# Patient Record
Sex: FEMALE | URBAN - METROPOLITAN AREA
[De-identification: names, ages, dates, MRNs, and addresses within clinical notes are randomized per-mention and may not be internally consistent; named-entity substitution may affect disease eponyms.]

---
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
## 2023-01-01 NOTE — PROCEDURE: ISOTRETINOIN MONITORING
Interval/Overnight Events:         ========================VITAL SIGNS========================  T(C): 37.5 (23 @ 06:00), Max: 38.1 (23 @ 14:20)  HR: 131 (23 @ 06:00) (115 - 149)  BP: 64/36 (23 @ 12:52) (64/36 - 85/30)  ABP: 71/44 (23 @ 06:00) (60/35 - 75/46)  ABP(mean): 57 (23 @ 06:00) (47 - 62)  RR: 25 (23 @ 06:00) (25 - 36)  SpO2: 94% (23 @ 06:00) (94% - 100%)  CVP(mm Hg): --  Current Weight Gm 5840 (23 @ 15:00)      ========================NEUROLOGIC=======================  [ ] SBS:          [ ] BILL-1:          [ ] CAP-D          [ ] BIS:  [ ] EVD set at: ___ , Drainage in last 24 hours: ___ mL  [x] Adequacy of sedation and pain control has been assessed and adjusted    ========================RESPIRATORY=======================  Current support:   - Mechanical Ventilation: Mode: SIMV with PS, RR (machine): 25, FiO2: 30, PEEP: 10, PS: 10, ITime: 0.5, MAP: 13, PIP: 26  - End-Tidal CO2/TCOM:    - Inhaled Nitric Oxide:  - Extubation Readiness:     [ ] Not applicable    [ ] Discussed and assessed    Oxygenation Index= 3.8   [Based on FiO2 = 30 (2023 23:11), PaO2 = 112 (2023 01:49), MAP = 14 (2023 23:11)]  Oxygen Saturation Index= 4.1   [Based on FiO2 = 30 (2023 03:38), SpO2 = 94 (2023 06:00), MAP = 13 (2023 03:38)]    ======================CARDIOVASCULAR======================  Cardiac Rhythm:	   [ ] NSR          [ ] Other:  NIRS:     ==============FLUIDS / ELECTROLYTES / NUTRITION===============  Daily Weight Gm: 5900 (29 Dec 2023 12:52)  I&O's Summary    29 Dec 2023 07:01  -  30 Dec 2023 07:00  --------------------------------------------------------  IN: 1023.5 mL / OUT: 920 mL / NET: 103.5 mL      Diet, NPO with Tube Feed - Pediatric:   Tube Feeding Modality: Gastro-jejunostomy Tube  Other TF (OTHERTF)  Total Volume for 24 Hours (mL): 768  Continuous  Starting Tube Feed Rate mL per Hour: 5  Increase Tube Feed Rate by (mL): 5    Every 4 hours  Until Goal Tube Feed Rate (mL per Hour): 32  Tube Feed Duration (in Hours): 24  Tube Feed Start Time: 05:30  Tube Feeding Instructions:   EHM fortified with Similac Pro Advance to 27cal/oz (90ml EHM + 2 tsp powder) at 32ml/hr x24 hours (23 @ 05:37) [Active]          ========================HEMATOLOGIC=======================  Transfusions:    [ ] RBC       [ ] Platelets       [ ] FFP       [ ] Cryoprecipitate    VTE Screening: [ ] Completed   VTE Prophylaxis:  [ ] Sequential compression device  [ ] Lovenox  [ ] Heparin  [ ] Not indicated     =====================INFECTIOUS DISEASE======================  RECENT CULTURES:   @ 05:15 Catheterized Catheterized     No growth       @ 14:00 ET Tube ET Tube Enterobacter cloacae complex    Numerous Enterobacter cloacae complex  Normal Respiratory Mariana present    Moderate polymorphonuclear leukocytes per low power field  No Squamous epithelial cells per low power field  Rare Gram positive cocci in pairs per oil power field     @ 13:10 .Blood Blood-Peripheral     No growth at 72 Hours       @ 08:07 ET Tube ET Tube     Normal Respiratory Mariana present    Moderate polymorphonuclear leukocytes per low power field  No Squamous epithelial cells per low power field  No organisms seen per oil power field        RVP:   @ 12:40  229E Coronavirus: --           Adenovirus: NotDetec     Bordetella Pertussis NotDetec     Chlamydia Pneumoniae NotDetec     Entero/Rhinovirus NotDetec     HKU1 Coronavirus --           hMPV NotDetec     Influenza A NotDetec     Influenza AH1 --           Influenza AH1 2009 --           Influenza AH3 --           Influenza B NotDetec     Mycoplasma pneumoniae NotDetec     NL63 Coronavirus --           OC43 Coronavirus --           Parainfluenza 1 NotDetec     Parainfluenza 2 NotDetec     Parainfluenza 3 NotDetec     Parainfluenza 4 NotDetec     Resp Syncytial Virus NotDetec           ========================MEDICATIONS=========================  Neurologic Medications:  dexMEDEtomidine Infusion - Peds 2 MICROgram(s)/kG/Hr IV Continuous <Continuous>  levETIRAcetam IV Intermittent - Peds 60 milliGRAM(s) IV Intermittent every 12 hours  methadone IV Intermittent -  0.7 milliGRAM(s) IV Intermittent every 6 hours  midazolam Infusion - Peds 0.2 mG/kG/Hr IV Continuous <Continuous>  midazolam IV Intermittent - Peds 1.2 milliGRAM(s) IV Intermittent every 1 hour PRN  morphine  IV Intermittent - Peds 2.8 milliGRAM(s) IV Intermittent every 1 hour PRN  morphine Infusion - Peds 0.47 mG/kG/Hr IV Continuous <Continuous>    Respiratory Medications:  albuterol  Intermittent Nebulization - Peds 2.5 milliGRAM(s) Nebulizer every 4 hours  dornase reyna for Nebulization - Peds 2.5 milliGRAM(s) Nebulizer every 12 hours  ipratropium 0.02% for Nebulization - Peds 250 MICROGram(s) Inhalation every 8 hours  sodium chloride 3% for Nebulization - Peds 2 milliLiter(s) Nebulizer every 4 hours    Cardiovascular Medications:  furosemide  IV Intermittent - Peds 6 milliGRAM(s) IV Intermittent every 12 hours    Gastrointestinal Medications:  dextrose 5% + sodium chloride 0.9% with potassium chloride 20 mEq/L. - Pediatric 1000 milliLiter(s) IV Continuous <Continuous>  famotidine IV Intermittent - Peds 3 milliGRAM(s) IV Intermittent every 12 hours  pantoprazole  IV Intermittent - Peds 3.5 milliGRAM(s) IV Intermittent every 12 hours  sodium bicarbonate 8.4% IV Intermittent - Peds 2 milliEquivalent(s) IV Intermittent once  sodium chloride 0.9% -  250 milliLiter(s) IV Continuous <Continuous>  sodium chloride 0.9%. - Pediatric 1000 milliLiter(s) IV Continuous <Continuous>    Hematologic/Oncologic Medications:    Antimicrobials/Immunologic Medications:    Endocrine/Metabolic Medications:    Genitourinary Medications:    Topical/Other Medications:  chlorhexidine 0.12% Oral Liquid - Peds 15 milliLiter(s) Swish and Spit two times a day  chlorhexidine 2% Topical Cloths - Peds 1 Application(s) Topical daily  petrolatum, white/mineral oil Ophthalmic Ointment - Peds 1 Application(s) Both EYES two times a day      ==================PHYSICAL EXAM ======================    *******  *******  *******      ============================LABS=============================  Labs:  ABG - ( 30 Dec 2023 01:49 )  pH: 7.37  /  pCO2: 41    /  pO2: 112   / HCO3: 24    / Base Excess: -1.5  /  SaO2: 98.8  / Lactate: x                                                  9.7                   Neurophils% (auto):   16.1   ( @ 01:54):    6.45 )-----------(194          Lymphocytes% (auto):  69.3                                          30.8                   Eosinphils% (auto):   6.7      Manual%: Neutrophils x    ; Lymphocytes x    ; Eosinophils x    ; Bands%: x    ; Blasts x                                    149    |  119    |  <2                  Calcium: 8.4   / iCa: 1.28   ( @ 01:54)    ----------------------------<  115       Magnesium: 1.60                             3.8     |  20     |  <0.20            Phosphorous: 4.6      TPro  4.5    /  Alb  2.8    /  TBili  0.4    /  DBili  x      /  AST  17     /  ALT  24     /  AlkPhos  162    30 Dec 2023 01:54      Urinalysis Basic - ( 30 Dec 2023 01:54 )    Color: x / Appearance: x / SG: x / pH: x  Gluc: 115 mg/dL / Ketone: x  / Bili: x / Urobili: x   Blood: x / Protein: x / Nitrite: x   Leuk Esterase: x / RBC: x / WBC x   Sq Epi: x / Non Sq Epi: x / Bacteria: x      ==========================IMAGING============================  [ ] Relevant imaging reviewed     Findings:       ==============================================================   Interval/Overnight Events:     Yesterday patient underwent tracheal dilation. Feeds resumed this AM and increasing to goal.       ========================VITAL SIGNS========================  T(C): 37.5 (23 @ 06:00), Max: 38.1 (23 @ 14:20)  HR: 131 (23 @ 06:00) (115 - 149)  BP: 64/36 (23 @ 12:52) (64/36 - 85/30)  ABP: 71/44 (23 @ 06:00) (60/35 - 75/46)  ABP(mean): 57 (23 @ 06:00) (47 - 62)  RR: 25 (23 @ 06:00) (25 - 36)  SpO2: 94% (23 @ 06:00) (94% - 100%)  CVP(mm Hg): --  Current Weight Gm 5840 (23 @ 15:00)      ========================NEUROLOGIC=======================  [ ] SBS:          [ ] BILL-1:          [ ] CAP-D          [ ] BIS:  [ ] EVD set at: ___ , Drainage in last 24 hours: ___ mL  [x] Adequacy of sedation and pain control has been assessed and adjusted    ========================RESPIRATORY=======================  Current support:   - Mechanical Ventilation: Mode: SIMV with PS, RR (machine): 25, FiO2: 30, PEEP: 10, PS: 10, ITime: 0.5, MAP: 13, PIP: 26  - End-Tidal CO2/TCOM:    - Inhaled Nitric Oxide:  - Extubation Readiness:     [ ] Not applicable    [X ] Discussed and assessed    Oxygenation Index= 3.8   [Based on FiO2 = 30 (2023 23:11), PaO2 = 112 (2023 01:49), MAP = 14 (2023 23:11)]  Oxygen Saturation Index= 4.1   [Based on FiO2 = 30 (2023 03:38), SpO2 = 94 (2023 06:00), MAP = 13 (2023 03:38)]    ======================CARDIOVASCULAR======================  Cardiac Rhythm:	   [X ] NSR          [ ] Other:  NIRS:     ==============FLUIDS / ELECTROLYTES / NUTRITION===============  Daily Weight Gm: 5900 (29 Dec 2023 12:52)  I&O's Summary    29 Dec 2023 07:01  -  30 Dec 2023 07:00  --------------------------------------------------------  IN: 1023.5 mL / OUT: 920 mL / NET: 103.5 mL      Diet, NPO with Tube Feed - Pediatric:   Tube Feeding Modality: Gastro-jejunostomy Tube  Other TF (OTHERTF)  Total Volume for 24 Hours (mL): 768  Continuous  Starting Tube Feed Rate mL per Hour: 5  Increase Tube Feed Rate by (mL): 5    Every 4 hours  Until Goal Tube Feed Rate (mL per Hour): 32  Tube Feed Duration (in Hours): 24  Tube Feed Start Time: 05:30  Tube Feeding Instructions:   EHM fortified with Similac Pro Advance to 27cal/oz (90ml EHM + 2 tsp powder) at 32ml/hr x24 hours (23 @ 05:37) [Active]          ========================HEMATOLOGIC=======================  Transfusions:    [ ] RBC       [ ] Platelets       [ ] FFP       [ ] Cryoprecipitate    VTE Screening: [ ] Completed   VTE Prophylaxis:  [ ] Sequential compression device  [ ] Lovenox  [ ] Heparin  [ ] Not indicated     =====================INFECTIOUS DISEASE======================  RECENT CULTURES:   @ 05:15 Catheterized Catheterized     No growth       @ 14:00 ET Tube ET Tube Enterobacter cloacae complex    Numerous Enterobacter cloacae complex  Normal Respiratory Mariana present    Moderate polymorphonuclear leukocytes per low power field  No Squamous epithelial cells per low power field  Rare Gram positive cocci in pairs per oil power field     @ 13:10 .Blood Blood-Peripheral     No growth at 72 Hours       @ 08:07 ET Tube ET Tube     Normal Respiratory Mariana present    Moderate polymorphonuclear leukocytes per low power field  No Squamous epithelial cells per low power field  No organisms seen per oil power field        RVP:   @ 12:40  229E Coronavirus: --           Adenovirus: NotDetec     Bordetella Pertussis NotDetec     Chlamydia Pneumoniae NotDetec     Entero/Rhinovirus NotDetec     HKU1 Coronavirus --           hMPV NotDetec     Influenza A NotDetec     Influenza AH1 --           Influenza AH1 2009 --           Influenza AH3 --           Influenza B NotDetec     Mycoplasma pneumoniae NotDetec     NL63 Coronavirus --           OC43 Coronavirus --           Parainfluenza 1 NotDetec     Parainfluenza 2 NotDetec     Parainfluenza 3 NotDetec     Parainfluenza 4 NotDetec     Resp Syncytial Virus NotDetec           ========================MEDICATIONS=========================  Neurologic Medications:  dexMEDEtomidine Infusion - Peds 2 MICROgram(s)/kG/Hr IV Continuous <Continuous>  levETIRAcetam IV Intermittent - Peds 60 milliGRAM(s) IV Intermittent every 12 hours  methadone IV Intermittent -  0.7 milliGRAM(s) IV Intermittent every 6 hours  midazolam Infusion - Peds 0.2 mG/kG/Hr IV Continuous <Continuous>  midazolam IV Intermittent - Peds 1.2 milliGRAM(s) IV Intermittent every 1 hour PRN  morphine  IV Intermittent - Peds 2.8 milliGRAM(s) IV Intermittent every 1 hour PRN  morphine Infusion - Peds 0.47 mG/kG/Hr IV Continuous <Continuous>    Respiratory Medications:  albuterol  Intermittent Nebulization - Peds 2.5 milliGRAM(s) Nebulizer every 4 hours  dornase reyna for Nebulization - Peds 2.5 milliGRAM(s) Nebulizer every 12 hours  ipratropium 0.02% for Nebulization - Peds 250 MICROGram(s) Inhalation every 8 hours  sodium chloride 3% for Nebulization - Peds 2 milliLiter(s) Nebulizer every 4 hours    Cardiovascular Medications:  furosemide  IV Intermittent - Peds 6 milliGRAM(s) IV Intermittent every 12 hours    Gastrointestinal Medications:  dextrose 5% + sodium chloride 0.9% with potassium chloride 20 mEq/L. - Pediatric 1000 milliLiter(s) IV Continuous <Continuous>  famotidine IV Intermittent - Peds 3 milliGRAM(s) IV Intermittent every 12 hours  pantoprazole  IV Intermittent - Peds 3.5 milliGRAM(s) IV Intermittent every 12 hours  sodium bicarbonate 8.4% IV Intermittent - Peds 2 milliEquivalent(s) IV Intermittent once  sodium chloride 0.9% -  250 milliLiter(s) IV Continuous <Continuous>  sodium chloride 0.9%. - Pediatric 1000 milliLiter(s) IV Continuous <Continuous>    Hematologic/Oncologic Medications:    Antimicrobials/Immunologic Medications:    Endocrine/Metabolic Medications:    Genitourinary Medications:    Topical/Other Medications:  chlorhexidine 0.12% Oral Liquid - Peds 15 milliLiter(s) Swish and Spit two times a day  chlorhexidine 2% Topical Cloths - Peds 1 Application(s) Topical daily  petrolatum, white/mineral oil Ophthalmic Ointment - Peds 1 Application(s) Both EYES two times a day      ==================PHYSICAL EXAM ======================    *******  *******  *******      ============================LABS=============================  Labs:  ABG - ( 30 Dec 2023 01:49 )  pH: 7.37  /  pCO2: 41    /  pO2: 112   / HCO3: 24    / Base Excess: -1.5  /  SaO2: 98.8  / Lactate: x                                                  9.7                   Neurophils% (auto):   16.1   ( @ 01:54):    6.45 )-----------(194          Lymphocytes% (auto):  69.3                                          30.8                   Eosinphils% (auto):   6.7      Manual%: Neutrophils x    ; Lymphocytes x    ; Eosinophils x    ; Bands%: x    ; Blasts x                                    149    |  119    |  <2                  Calcium: 8.4   / iCa: 1.28   ( @ 01:54)    ----------------------------<  115       Magnesium: 1.60                             3.8     |  20     |  <0.20            Phosphorous: 4.6      TPro  4.5    /  Alb  2.8    /  TBili  0.4    /  DBili  x      /  AST  17     /  ALT  24     /  AlkPhos  162    30 Dec 2023 01:54      Urinalysis Basic - ( 30 Dec 2023 01:54 )    Color: x / Appearance: x / SG: x / pH: x  Gluc: 115 mg/dL / Ketone: x  / Bili: x / Urobili: x   Blood: x / Protein: x / Nitrite: x   Leuk Esterase: x / RBC: x / WBC x   Sq Epi: x / Non Sq Epi: x / Bacteria: x      ==========================IMAGING============================  [ ] Relevant imaging reviewed     Findings:     < from: Xray Chest 1 View- PORTABLE-Routine (Xray Chest 1 View- PORTABLE-Routine in AM.) (23 @ 01:31) >    FINDINGS/  impression: Single AP view of the chest demonstrates endotracheal tube   tip above july. Enteric tube tip is collimated off the examination but   is within the abdominal region. Left IJ catheter tip is in the region of   the SVC. There is a G-J-tube noted the tip of which is in the left upper   quadrant. Heart size is stable. Left lower lobe opacity with air   bronchograms is unchanged and may represent atelectasis versus   consolidation. There is also opacity in the right upper lobe, atelectasis   versus consolidation, unchanged from prior study.      < end of copied text >    ==============================================================   Interval/Overnight Events:     Yesterday patient underwent tracheal dilation. Feeds resumed this AM and increasing to goal. Newly febrile to 102F but clinically well appearing with no elevation in white count, no new clinical change and in setting of being post-op day 1.       ========================VITAL SIGNS========================  T(C): 37.5 (23 @ 06:00), Max: 38.1 (23 @ 14:20)  HR: 131 (23 @ 06:00) (115 - 149)  BP: 64/36 (23 @ 12:52) (64/36 - 85/30)  ABP: 71/44 (23 @ 06:00) (60/35 - 75/46)  ABP(mean): 57 (23 @ 06:00) (47 - 62)  RR: 25 (23 @ 06:00) (25 - 36)  SpO2: 94% (23 @ 06:00) (94% - 100%)  CVP(mm Hg): --  Current Weight Gm 5840 (23 @ 15:00)      ========================NEUROLOGIC=======================  [ X] SBS:          [ ] BILL-1:          [ ] CAP-D          [ ] BIS:  [ ] EVD set at: ___ , Drainage in last 24 hours: ___ mL  [x] Adequacy of sedation and pain control has been assessed and adjusted    ========================RESPIRATORY=======================  Current support:   - Mechanical Ventilation: Mode: SIMV with PS, RR (machine): 25, FiO2: 30, PEEP: 10, PS: 10, ITime: 0.5, MAP: 13, PIP: 26  - End-Tidal CO2/TCOM:  30-40s  - Inhaled Nitric Oxide:  - Extubation Readiness:     [ ] Not applicable    [X ] Discussed and assessed    Oxygenation Index= 3.8   [Based on FiO2 = 30 (2023 23:11), PaO2 = 112 (2023 01:49), MAP = 14 (2023 23:11)]  Oxygen Saturation Index= 4.1   [Based on FiO2 = 30 (2023 03:38), SpO2 = 94 (2023 06:00), MAP = 13 (2023 03:38)]    ======================CARDIOVASCULAR======================  Cardiac Rhythm:	   [X ] NSR          [ ] Other:  NIRS:     ==============FLUIDS / ELECTROLYTES / NUTRITION===============  Daily Weight Gm: 5900 (29 Dec 2023 12:52)  I&O's Summary    29 Dec 2023 07:01  -  30 Dec 2023 07:00  --------------------------------------------------------  IN: 1023.5 mL / OUT: 920 mL / NET: 103.5 mL      Diet, NPO with Tube Feed - Pediatric:   Tube Feeding Modality: Gastro-jejunostomy Tube  Other TF (OTHERTF)  Total Volume for 24 Hours (mL): 768  Continuous  Starting Tube Feed Rate mL per Hour: 5  Increase Tube Feed Rate by (mL): 5    Every 4 hours  Until Goal Tube Feed Rate (mL per Hour): 32  Tube Feed Duration (in Hours): 24  Tube Feed Start Time: 05:30  Tube Feeding Instructions:   EHM fortified with Similac Pro Advance to 27cal/oz (90ml EHM + 2 tsp powder) at 32ml/hr x24 hours (23 @ 05:37) [Active]          ========================HEMATOLOGIC=======================  Transfusions:    [ ] RBC       [ ] Platelets       [ ] FFP       [ ] Cryoprecipitate    VTE Screening: [ ] Completed   VTE Prophylaxis:  [ ] Sequential compression device  [ ] Lovenox  [ ] Heparin  [ ] Not indicated     =====================INFECTIOUS DISEASE======================  RECENT CULTURES:   @ 05:15 Catheterized Catheterized     No growth       @ 14:00 ET Tube ET Tube Enterobacter cloacae complex    Numerous Enterobacter cloacae complex  Normal Respiratory Mariana present    Moderate polymorphonuclear leukocytes per low power field  No Squamous epithelial cells per low power field  Rare Gram positive cocci in pairs per oil power field     @ 13:10 .Blood Blood-Peripheral     No growth at 72 Hours       @ 08:07 ET Tube ET Tube     Normal Respiratory Mariana present    Moderate polymorphonuclear leukocytes per low power field  No Squamous epithelial cells per low power field  No organisms seen per oil power field        RVP:   @ 12:40  229E Coronavirus: --           Adenovirus: NotDetec     Bordetella Pertussis NotDetec     Chlamydia Pneumoniae NotDetec     Entero/Rhinovirus NotDetec     HKU1 Coronavirus --           hMPV NotDetec     Influenza A NotDetec     Influenza AH1 --           Influenza AH1  --           Influenza AH3 --           Influenza B NotDetec     Mycoplasma pneumoniae NotDetec     NL63 Coronavirus --           OC43 Coronavirus --           Parainfluenza 1 NotDetec     Parainfluenza 2 NotDetec     Parainfluenza 3 NotDetec     Parainfluenza 4 NotDetec     Resp Syncytial Virus NotDetec           ========================MEDICATIONS=========================  Neurologic Medications:  dexMEDEtomidine Infusion - Peds 2 MICROgram(s)/kG/Hr IV Continuous <Continuous>  levETIRAcetam IV Intermittent - Peds 60 milliGRAM(s) IV Intermittent every 12 hours  methadone IV Intermittent -  0.7 milliGRAM(s) IV Intermittent every 6 hours  midazolam Infusion - Peds 0.2 mG/kG/Hr IV Continuous <Continuous>  midazolam IV Intermittent - Peds 1.2 milliGRAM(s) IV Intermittent every 1 hour PRN  morphine  IV Intermittent - Peds 2.8 milliGRAM(s) IV Intermittent every 1 hour PRN  morphine Infusion - Peds 0.47 mG/kG/Hr IV Continuous <Continuous>    Respiratory Medications:  albuterol  Intermittent Nebulization - Peds 2.5 milliGRAM(s) Nebulizer every 4 hours  dornase reyna for Nebulization - Peds 2.5 milliGRAM(s) Nebulizer every 12 hours  ipratropium 0.02% for Nebulization - Peds 250 MICROGram(s) Inhalation every 8 hours  sodium chloride 3% for Nebulization - Peds 2 milliLiter(s) Nebulizer every 4 hours    Cardiovascular Medications:  furosemide  IV Intermittent - Peds 6 milliGRAM(s) IV Intermittent every 12 hours    Gastrointestinal Medications:  dextrose 5% + sodium chloride 0.9% with potassium chloride 20 mEq/L. - Pediatric 1000 milliLiter(s) IV Continuous <Continuous>  famotidine IV Intermittent - Peds 3 milliGRAM(s) IV Intermittent every 12 hours  pantoprazole  IV Intermittent - Peds 3.5 milliGRAM(s) IV Intermittent every 12 hours  sodium bicarbonate 8.4% IV Intermittent - Peds 2 milliEquivalent(s) IV Intermittent once  sodium chloride 0.9% -  250 milliLiter(s) IV Continuous <Continuous>  sodium chloride 0.9%. - Pediatric 1000 milliLiter(s) IV Continuous <Continuous>    Hematologic/Oncologic Medications:    Antimicrobials/Immunologic Medications:    Endocrine/Metabolic Medications:    Genitourinary Medications:    Topical/Other Medications:  chlorhexidine 0.12% Oral Liquid - Peds 15 milliLiter(s) Swish and Spit two times a day  chlorhexidine 2% Topical Cloths - Peds 1 Application(s) Topical daily  petrolatum, white/mineral oil Ophthalmic Ointment - Peds 1 Application(s) Both EYES two times a day      ==================PHYSICAL EXAM ======================  General:	No distress, intubated, sedated   HEENT:             NCAT, PERRL, moist mucous membranes   Respiratory:      Orally intubated, Effort even and unlabored. Clear bilaterally.   CV:                   RRR, Normal S1/S2. No murmur. Cap refill < 2 seconds. Warm & well perfused.   Abdomen:	Soft, non-distended. Bowel sounds present.   Skin:		No rashes.  Extremities:	Warm and well perfused.   Neurologic:	Sedated but arousable. PERRLA. Moves all extremities. .  ============================LABS=============================    Labs:  ABG - ( 30 Dec 2023 01:49 )  pH: 7.37  /  pCO2: 41    /  pO2: 112   / HCO3: 24    / Base Excess: -1.5  /  SaO2: 98.8  / Lactate: x                                                  9.7                   Neurophils% (auto):   16.1   ( @ 01:54):    6.45 )-----------(194          Lymphocytes% (auto):  69.3                                          30.8                   Eosinphils% (auto):   6.7      Manual%: Neutrophils x    ; Lymphocytes x    ; Eosinophils x    ; Bands%: x    ; Blasts x                                    149    |  119    |  <2                  Calcium: 8.4   / iCa: 1.28   ( @ 01:54)    ----------------------------<  115       Magnesium: 1.60                             3.8     |  20     |  <0.20            Phosphorous: 4.6      TPro  4.5    /  Alb  2.8    /  TBili  0.4    /  DBili  x      /  AST  17     /  ALT  24     /  AlkPhos  162    30 Dec 2023 01:54      Urinalysis Basic - ( 30 Dec 2023 01:54 )    Color: x / Appearance: x / SG: x / pH: x  Gluc: 115 mg/dL / Ketone: x  / Bili: x / Urobili: x   Blood: x / Protein: x / Nitrite: x   Leuk Esterase: x / RBC: x / WBC x   Sq Epi: x / Non Sq Epi: x / Bacteria: x      ==========================IMAGING============================  [X] Relevant imaging reviewed     Findings:     < from: Xray Chest 1 View- PORTABLE-Routine (Xray Chest 1 View- PORTABLE-Routine in AM.) (23 @ 01:31) >    FINDINGS/  impression: Single AP view of the chest demonstrates endotracheal tube   tip above july. Enteric tube tip is collimated off the examination but   is within the abdominal region. Left IJ catheter tip is in the region of   the SVC. There is a G-J-tube noted the tip of which is in the left upper   quadrant. Heart size is stable. Left lower lobe opacity with air   bronchograms is unchanged and may represent atelectasis versus   consolidation. There is also opacity in the right upper lobe, atelectasis   versus consolidation, unchanged from prior study.      < end of copied text >    ==============================================================   Myalgia Monitoring: - At this point, patient reports that this is mild and tolerable. Explained this is common when taking isotretinoin. If this worsens, contact us.

## 2023-06-23 ENCOUNTER — APPOINTMENT (OUTPATIENT)
Dept: URBAN - METROPOLITAN AREA CLINIC 252 | Age: 18
Setting detail: DERMATOLOGY
End: 2023-06-23

## 2023-06-23 VITALS — WEIGHT: 160 LBS | HEIGHT: 67 IN

## 2023-06-23 DIAGNOSIS — B36.0 PITYRIASIS VERSICOLOR: ICD-10-CM

## 2023-06-23 DIAGNOSIS — L70.0 ACNE VULGARIS: ICD-10-CM

## 2023-06-23 PROCEDURE — OTHER ADDITIONAL NOTES: OTHER

## 2023-06-23 PROCEDURE — OTHER COUNSELING: OTHER

## 2023-06-23 PROCEDURE — OTHER MIPS QUALITY: OTHER

## 2023-06-23 PROCEDURE — OTHER PRESCRIPTION: OTHER

## 2023-06-23 PROCEDURE — 99214 OFFICE O/P EST MOD 30 MIN: CPT

## 2023-06-23 RX ORDER — FLUCONAZOLE 200 MG/1
300MG TABLET ORAL
Qty: 4 | Refills: 1 | Status: ERX | COMMUNITY
Start: 2023-06-23

## 2023-06-23 RX ORDER — CLASCOTERONE 1 G/100G
1% CREAM TOPICAL BID
Qty: 60 | Refills: 12 | Status: ERX | COMMUNITY
Start: 2023-06-23

## 2023-06-23 RX ORDER — TRETIONIN 0.25 MG/G
0.025% CREAM TOPICAL QHS
Qty: 45 | Refills: 6 | Status: ERX

## 2023-06-23 ASSESSMENT — LOCATION ZONE DERM
LOCATION ZONE: NECK
LOCATION ZONE: FACE

## 2023-06-23 ASSESSMENT — LOCATION DETAILED DESCRIPTION DERM
LOCATION DETAILED: LEFT CENTRAL LATERAL NECK
LOCATION DETAILED: RIGHT CENTRAL LATERAL NECK
LOCATION DETAILED: RIGHT INFERIOR CENTRAL MALAR CHEEK

## 2023-06-23 ASSESSMENT — LOCATION SIMPLE DESCRIPTION DERM
LOCATION SIMPLE: RIGHT CHEEK
LOCATION SIMPLE: NECK

## 2023-06-23 NOTE — PROCEDURE: COUNSELING
Erythromycin Counseling:  I discussed with the patient the risks of erythromycin including but not limited to GI upset, allergic reaction, drug rash, diarrhea, increase in liver enzymes, and yeast infections.
Minocycline Pregnancy And Lactation Text: This medication is Pregnancy Category D and not consider safe during pregnancy. It is also excreted in breast milk.
Winlevi Counseling:  I discussed with the patient the risks of topical clascoterone including but not limited to erythema, scaling, itching, and stinging. Patient voiced their understanding.
Topical Retinoid Pregnancy And Lactation Text: This medication is Pregnancy Category C. It is unknown if this medication is excreted in breast milk.
Aklief counseling:  Patient advised to apply a pea-sized amount only at bedtime and wait 30 minutes after washing their face before applying.  If too drying, patient may add a non-comedogenic moisturizer.  The most commonly reported side effects including irritation, redness, scaling, dryness, stinging, burning, itching, and increased risk of sunburn.  The patient verbalized understanding of the proper use and possible adverse effects of retinoids.  All of the patient's questions and concerns were addressed.
Tazorac Pregnancy And Lactation Text: This medication is not safe during pregnancy. It is unknown if this medication is excreted in breast milk.
Azelaic Acid Counseling: Patient counseled that medicine may cause skin irritation and to avoid applying near the eyes.  In the event of skin irritation, the patient was advised to reduce the amount of the drug applied or use it less frequently.   The patient verbalized understanding of the proper use and possible adverse effects of azelaic acid.  All of the patient's questions and concerns were addressed.
Isotretinoin Counseling: Patient should get monthly blood tests, not donate blood, not drive at night if vision affected, not share medication, and not undergo elective surgery for 6 months after tx completed. Side effects reviewed, pt to contact office should one occur.
Bactrim Pregnancy And Lactation Text: This medication is Pregnancy Category D and is known to cause fetal risk.  It is also excreted in breast milk.
Topical Sulfur Applications Counseling: Topical Sulfur Counseling: Patient counseled that this medication may cause skin irritation or allergic reactions.  In the event of skin irritation, the patient was advised to reduce the amount of the drug applied or use it less frequently.   The patient verbalized understanding of the proper use and possible adverse effects of topical sulfur application.  All of the patient's questions and concerns were addressed.
Topical Clindamycin Counseling: Patient counseled that this medication may cause skin irritation or allergic reactions.  In the event of skin irritation, the patient was advised to reduce the amount of the drug applied or use it less frequently.   The patient verbalized understanding of the proper use and possible adverse effects of clindamycin.  All of the patient's questions and concerns were addressed.
Azelaic Acid Pregnancy And Lactation Text: This medication is considered safe during pregnancy and breast feeding.
Dapsone Counseling: I discussed with the patient the risks of dapsone including but not limited to hemolytic anemia, agranulocytosis, rashes, methemoglobinemia, kidney failure, peripheral neuropathy, headaches, GI upset, and liver toxicity.  Patients who start dapsone require monitoring including baseline LFTs and weekly CBCs for the first month, then every month thereafter.  The patient verbalized understanding of the proper use and possible adverse effects of dapsone.  All of the patient's questions and concerns were addressed.
Isotretinoin Pregnancy And Lactation Text: This medication is Pregnancy Category X and is considered extremely dangerous during pregnancy. It is unknown if it is excreted in breast milk.
Spironolactone Counseling: Patient advised regarding risks of diarrhea, abdominal pain, hyperkalemia, birth defects (for female patients), liver toxicity and renal toxicity. The patient may need blood work to monitor liver and kidney function and potassium levels while on therapy. The patient verbalized understanding of the proper use and possible adverse effects of spironolactone.  All of the patient's questions and concerns were addressed.
Doxycycline Counseling:  Patient counseled regarding possible photosensitivity and increased risk for sunburn.  Patient instructed to avoid sunlight, if possible.  When exposed to sunlight, patients should wear protective clothing, sunglasses, and sunscreen.  The patient was instructed to call the office immediately if the following severe adverse effects occur:  hearing changes, easy bruising/bleeding, severe headache, or vision changes.  The patient verbalized understanding of the proper use and possible adverse effects of doxycycline.  All of the patient's questions and concerns were addressed.
Benzoyl Peroxide Pregnancy And Lactation Text: This medication is Pregnancy Category C. It is unknown if benzoyl peroxide is excreted in breast milk.
High Dose Vitamin A Pregnancy And Lactation Text: High dose vitamin A therapy is contraindicated during pregnancy and breast feeding.
Tetracycline Counseling: Patient counseled regarding possible photosensitivity and increased risk for sunburn.  Patient instructed to avoid sunlight, if possible.  When exposed to sunlight, patients should wear protective clothing, sunglasses, and sunscreen.  The patient was instructed to call the office immediately if the following severe adverse effects occur:  hearing changes, easy bruising/bleeding, severe headache, or vision changes.  The patient verbalized understanding of the proper use and possible adverse effects of tetracycline.  All of the patient's questions and concerns were addressed. Patient understands to avoid pregnancy while on therapy due to potential birth defects.
Azithromycin Pregnancy And Lactation Text: This medication is considered safe during pregnancy and is also secreted in breast milk.
Sarecycline Counseling: Patient advised regarding possible photosensitivity and discoloration of the teeth, skin, lips, tongue and gums.  Patient instructed to avoid sunlight, if possible.  When exposed to sunlight, patients should wear protective clothing, sunglasses, and sunscreen.  The patient was instructed to call the office immediately if the following severe adverse effects occur:  hearing changes, easy bruising/bleeding, severe headache, or vision changes.  The patient verbalized understanding of the proper use and possible adverse effects of sarecycline.  All of the patient's questions and concerns were addressed.
Aklief Pregnancy And Lactation Text: It is unknown if this medication is safe to use during pregnancy.  It is unknown if this medication is excreted in breast milk.  Breastfeeding women should use the topical cream on the smallest area of the skin for the shortest time needed while breastfeeding.  Do not apply to nipple and areola.
Winlevi Pregnancy And Lactation Text: This medication is considered safe during pregnancy and breastfeeding.
Tazorac Counseling:  Patient advised that medication is irritating and drying.  Patient may need to apply sparingly and wash off after an hour before eventually leaving it on overnight.  The patient verbalized understanding of the proper use and possible adverse effects of tazorac.  All of the patient's questions and concerns were addressed.
Bactrim Counseling:  I discussed with the patient the risks of sulfa antibiotics including but not limited to GI upset, allergic reaction, drug rash, diarrhea, dizziness, photosensitivity, and yeast infections.  Rarely, more serious reactions can occur including but not limited to aplastic anemia, agranulocytosis, methemoglobinemia, blood dyscrasias, liver or kidney failure, lung infiltrates or desquamative/blistering drug rashes.
Doxycycline Pregnancy And Lactation Text: This medication is Pregnancy Category D and not consider safe during pregnancy. It is also excreted in breast milk but is considered safe for shorter treatment courses.
Minocycline Counseling: Patient advised regarding possible photosensitivity and discoloration of the teeth, skin, lips, tongue and gums.  Patient instructed to avoid sunlight, if possible.  When exposed to sunlight, patients should wear protective clothing, sunglasses, and sunscreen.  The patient was instructed to call the office immediately if the following severe adverse effects occur:  hearing changes, easy bruising/bleeding, severe headache, or vision changes.  The patient verbalized understanding of the proper use and possible adverse effects of minocycline.  All of the patient's questions and concerns were addressed.
Birth Control Pills Counseling: Birth Control Pill Counseling: I discussed with the patient the potential side effects of OCPs including but not limited to increased risk of stroke, heart attack, thrombophlebitis, deep venous thrombosis, hepatic adenomas, breast changes, GI upset, headaches, and depression.  The patient verbalized understanding of the proper use and possible adverse effects of OCPs. All of the patient's questions and concerns were addressed.
Erythromycin Pregnancy And Lactation Text: This medication is Pregnancy Category B and is considered safe during pregnancy. It is also excreted in breast milk.
Use Enhanced Medication Counseling?: No
High Dose Vitamin A Counseling: Side effects reviewed, pt to contact office should one occur.
Spironolactone Pregnancy And Lactation Text: This medication can cause feminization of the male fetus and should be avoided during pregnancy. The active metabolite is also found in breast milk.
Topical Clindamycin Pregnancy And Lactation Text: This medication is Pregnancy Category B and is considered safe during pregnancy. It is unknown if it is excreted in breast milk.
Benzoyl Peroxide Counseling: Patient counseled that medicine may cause skin irritation and bleach clothing.  In the event of skin irritation, the patient was advised to reduce the amount of the drug applied or use it less frequently.   The patient verbalized understanding of the proper use and possible adverse effects of benzoyl peroxide.  All of the patient's questions and concerns were addressed.
Birth Control Pills Pregnancy And Lactation Text: This medication should be avoided if pregnant and for the first 30 days post-partum.
Detail Level: Zone
Topical Sulfur Applications Pregnancy And Lactation Text: This medication is Pregnancy Category C and has an unknown safety profile during pregnancy. It is unknown if this topical medication is excreted in breast milk.
Dapsone Pregnancy And Lactation Text: This medication is Pregnancy Category C and is not considered safe during pregnancy or breast feeding.
Topical Retinoid counseling:  Patient advised to apply a pea-sized amount only at bedtime and wait 30 minutes after washing their face before applying.  If too drying, patient may add a non-comedogenic moisturizer. The patient verbalized understanding of the proper use and possible adverse effects of retinoids.  All of the patient's questions and concerns were addressed.
Azithromycin Counseling:  I discussed with the patient the risks of azithromycin including but not limited to GI upset, allergic reaction, drug rash, diarrhea, and yeast infections.

## 2023-06-23 NOTE — PROCEDURE: MIPS QUALITY
Quality 47: Advance Care Plan: Advance Care Planning discussed and documented in the medical record; patient did not wish or was not able to name a surrogate decision maker or provide an advance care plan.
Quality 226: Preventive Care And Screening: Tobacco Use: Screening And Cessation Intervention: Patient screened for tobacco use and is an ex/non-smoker
Detail Level: Detailed
Quality 130: Documentation Of Current Medications In The Medical Record: Current Medications Documented
Quality 110: Preventive Care And Screening: Influenza Immunization: Influenza Immunization not Administered for Documented Reasons.
Quality 431: Preventive Care And Screening: Unhealthy Alcohol Use - Screening: Patient not identified as an unhealthy alcohol user when screened for unhealthy alcohol use using a systematic screening method

## 2023-06-23 NOTE — HPI: PIMPLES (ACNE)
Is This A New Presentation, Or A Follow-Up?: Acne
Additional Comments (Use Complete Sentences): At last office visit she continued spironolactone 50mg bid and tretinoin 0.025% . She stopped  because she is in the [employer].  This was helping though.

## 2023-06-23 NOTE — PROCEDURE: ADDITIONAL NOTES
Additional Notes: - Recommended consulting with  before starting winlevi. Winlevi copay card provided.\\n- Continue current regime of tretinoin qhs.
Detail Level: Detailed
Render Risk Assessment In Note?: no
Additional Notes: - Recommended otc antidandruff shampoos of head&shoulder or selsun blue.

## 2024-10-26 NOTE — PROCEDURE: ORDER TESTS
Billing Type: Third-Party Bill
[FreeTextEntry1] : CPE
[de-identified] : pt has been doing well without any acute illness or injury, compliant with all meds 
Expected Date Of Service: 05/19/2021
Bill For Surgical Tray: no

## 2025-05-15 ENCOUNTER — APPOINTMENT (OUTPATIENT)
Dept: URBAN - METROPOLITAN AREA CLINIC 252 | Age: 20
Setting detail: DERMATOLOGY
End: 2025-05-15

## 2025-05-15 VITALS — WEIGHT: 165 LBS | HEIGHT: 67 IN

## 2025-05-15 DIAGNOSIS — L81.4 OTHER MELANIN HYPERPIGMENTATION: ICD-10-CM

## 2025-05-15 DIAGNOSIS — D22 MELANOCYTIC NEVI: ICD-10-CM

## 2025-05-15 DIAGNOSIS — Z71.89 OTHER SPECIFIED COUNSELING: ICD-10-CM

## 2025-05-15 DIAGNOSIS — B36.0 PITYRIASIS VERSICOLOR: ICD-10-CM

## 2025-05-15 DIAGNOSIS — L81.3 CAFÉ AU LAIT SPOTS: ICD-10-CM

## 2025-05-15 PROBLEM — D23.72 OTHER BENIGN NEOPLASM OF SKIN OF LEFT LOWER LIMB, INCLUDING HIP: Status: ACTIVE | Noted: 2025-05-15

## 2025-05-15 PROBLEM — D22.72 MELANOCYTIC NEVI OF LEFT LOWER LIMB, INCLUDING HIP: Status: ACTIVE | Noted: 2025-05-15

## 2025-05-15 PROBLEM — D22.71 MELANOCYTIC NEVI OF RIGHT LOWER LIMB, INCLUDING HIP: Status: ACTIVE | Noted: 2025-05-15

## 2025-05-15 PROBLEM — D22.5 MELANOCYTIC NEVI OF TRUNK: Status: ACTIVE | Noted: 2025-05-15

## 2025-05-15 PROCEDURE — OTHER PRESCRIPTION: OTHER

## 2025-05-15 PROCEDURE — OTHER ADDITIONAL NOTES: OTHER

## 2025-05-15 PROCEDURE — 99214 OFFICE O/P EST MOD 30 MIN: CPT

## 2025-05-15 PROCEDURE — OTHER COUNSELING: OTHER

## 2025-05-15 RX ORDER — FLUCONAZOLE 200 MG/1
TABLET ORAL
Qty: 4 | Refills: 1 | Status: ERX | COMMUNITY
Start: 2025-05-15

## 2025-05-15 ASSESSMENT — LOCATION ZONE DERM
LOCATION ZONE: FACE
LOCATION ZONE: LEG
LOCATION ZONE: TRUNK

## 2025-05-15 ASSESSMENT — LOCATION DETAILED DESCRIPTION DERM
LOCATION DETAILED: MIDDLE STERNUM
LOCATION DETAILED: LEFT INFERIOR CENTRAL MALAR CHEEK
LOCATION DETAILED: RIGHT SUPERIOR UPPER BACK
LOCATION DETAILED: RIGHT ANTERIOR PROXIMAL THIGH
LOCATION DETAILED: SUPERIOR THORACIC SPINE
LOCATION DETAILED: LEFT ANTERIOR DISTAL THIGH
LOCATION DETAILED: RIGHT CENTRAL MALAR CHEEK
LOCATION DETAILED: RIGHT MEDIAL UPPER BACK
LOCATION DETAILED: PERIUMBILICAL SKIN
LOCATION DETAILED: RIGHT MID-UPPER BACK

## 2025-05-15 ASSESSMENT — LOCATION SIMPLE DESCRIPTION DERM
LOCATION SIMPLE: UPPER BACK
LOCATION SIMPLE: LEFT THIGH
LOCATION SIMPLE: RIGHT UPPER BACK
LOCATION SIMPLE: CHEST
LOCATION SIMPLE: ABDOMEN
LOCATION SIMPLE: RIGHT CHEEK
LOCATION SIMPLE: RIGHT THIGH
LOCATION SIMPLE: LEFT CHEEK